# Patient Record
Sex: MALE | URBAN - METROPOLITAN AREA
[De-identification: names, ages, dates, MRNs, and addresses within clinical notes are randomized per-mention and may not be internally consistent; named-entity substitution may affect disease eponyms.]

---

## 2022-02-11 ENCOUNTER — OFFICE VISIT (OUTPATIENT)
Dept: URBAN - METROPOLITAN AREA CLINIC 85 | Facility: CLINIC | Age: 67
End: 2022-02-11
Payer: MEDICARE

## 2022-02-11 DIAGNOSIS — H16.011 CORNEAL CORNEAL ULCER OF RIGHT EYE: Primary | ICD-10-CM

## 2022-02-11 PROCEDURE — 99204 OFFICE O/P NEW MOD 45 MIN: CPT | Performed by: OPTOMETRIST

## 2022-02-11 RX ORDER — POLYMYXIN B SULFATE AND TRIMETHOPRIM SULFATE 100000; 1 [USP'U]/ML; MG/ML
SOLUTION/ DROPS OPHTHALMIC
Qty: 1 | Refills: 4 | Status: ACTIVE
Start: 2022-02-11

## 2022-02-11 RX ORDER — OFLOXACIN 3 MG/ML
0.3 % SOLUTION/ DROPS OPHTHALMIC
Qty: 1 | Refills: 4 | Status: ACTIVE
Start: 2022-02-11

## 2022-02-11 ASSESSMENT — INTRAOCULAR PRESSURE
OS: 14
OD: 29

## 2022-02-11 NOTE — IMPRESSION/PLAN
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 Plan: Discussed persistent corneal ulcer OD (patient [facility] D/C sanya 128 and prednisolone for now. Recommend consult with corneal specialist soon. Long discussion regarding ? of visual potential of the eye, given history of HM vision prior to corneal ulcer issue. Initiate ofloxacin and polytrim alternating Q1H around the clock and follow up with corneal specialist in [facility] or [facility] if any worsening of condition. Discussed the fact that [facility] after corneal specialist consult, given patients issues with travel and they have followed him and done surgery on him as an established patient of theirs.   Patient reports no history of CL Wear